# Patient Record
Sex: MALE | URBAN - METROPOLITAN AREA
[De-identification: names, ages, dates, MRNs, and addresses within clinical notes are randomized per-mention and may not be internally consistent; named-entity substitution may affect disease eponyms.]

---

## 2020-01-08 ENCOUNTER — EMERGENCY (EMERGENCY)
Facility: HOSPITAL | Age: 25
LOS: 0 days | Discharge: PSYCHIATRIC OR SUBSTANCE USE FACILITY | End: 2020-01-09
Attending: EMERGENCY MEDICINE
Payer: MEDICAID

## 2020-01-08 VITALS
RESPIRATION RATE: 19 BRPM | OXYGEN SATURATION: 99 % | WEIGHT: 160.06 LBS | HEART RATE: 102 BPM | DIASTOLIC BLOOD PRESSURE: 105 MMHG | SYSTOLIC BLOOD PRESSURE: 195 MMHG

## 2020-01-08 DIAGNOSIS — F25.9 SCHIZOAFFECTIVE DISORDER, UNSPECIFIED: ICD-10-CM

## 2020-01-08 DIAGNOSIS — F99 MENTAL DISORDER, NOT OTHERWISE SPECIFIED: ICD-10-CM

## 2020-01-08 DIAGNOSIS — R45.850 HOMICIDAL IDEATIONS: ICD-10-CM

## 2020-01-08 DIAGNOSIS — R46.2 STRANGE AND INEXPLICABLE BEHAVIOR: ICD-10-CM

## 2020-01-08 LAB
ALBUMIN SERPL ELPH-MCNC: 3.9 G/DL — SIGNIFICANT CHANGE UP (ref 3.3–5)
ALP SERPL-CCNC: 86 U/L — SIGNIFICANT CHANGE UP (ref 40–120)
ALT FLD-CCNC: 23 U/L — SIGNIFICANT CHANGE UP (ref 12–78)
AMPHET UR-MCNC: NEGATIVE — SIGNIFICANT CHANGE UP
ANION GAP SERPL CALC-SCNC: 7 MMOL/L — SIGNIFICANT CHANGE UP (ref 5–17)
APAP SERPL-MCNC: < 2 UG/ML (ref 10–30)
AST SERPL-CCNC: 24 U/L — SIGNIFICANT CHANGE UP (ref 15–37)
BARBITURATES UR SCN-MCNC: NEGATIVE — SIGNIFICANT CHANGE UP
BASOPHILS # BLD AUTO: 0.04 K/UL — SIGNIFICANT CHANGE UP (ref 0–0.2)
BASOPHILS NFR BLD AUTO: 0.5 % — SIGNIFICANT CHANGE UP (ref 0–2)
BENZODIAZ UR-MCNC: NEGATIVE — SIGNIFICANT CHANGE UP
BILIRUB SERPL-MCNC: 0.5 MG/DL — SIGNIFICANT CHANGE UP (ref 0.2–1.2)
BUN SERPL-MCNC: 7 MG/DL — SIGNIFICANT CHANGE UP (ref 7–23)
CALCIUM SERPL-MCNC: 9.3 MG/DL — SIGNIFICANT CHANGE UP (ref 8.5–10.1)
CHLORIDE SERPL-SCNC: 109 MMOL/L — HIGH (ref 96–108)
CO2 SERPL-SCNC: 27 MMOL/L — SIGNIFICANT CHANGE UP (ref 22–31)
COCAINE METAB.OTHER UR-MCNC: NEGATIVE — SIGNIFICANT CHANGE UP
CREAT SERPL-MCNC: 1.1 MG/DL — SIGNIFICANT CHANGE UP (ref 0.5–1.3)
EOSINOPHIL # BLD AUTO: 0.24 K/UL — SIGNIFICANT CHANGE UP (ref 0–0.5)
EOSINOPHIL NFR BLD AUTO: 2.8 % — SIGNIFICANT CHANGE UP (ref 0–6)
ETHANOL SERPL-MCNC: <10 MG/DL — SIGNIFICANT CHANGE UP (ref 0–10)
GLUCOSE SERPL-MCNC: 89 MG/DL — SIGNIFICANT CHANGE UP (ref 70–99)
HCT VFR BLD CALC: 47.5 % — SIGNIFICANT CHANGE UP (ref 39–50)
HGB BLD-MCNC: 15.5 G/DL — SIGNIFICANT CHANGE UP (ref 13–17)
IMM GRANULOCYTES NFR BLD AUTO: 0.4 % — SIGNIFICANT CHANGE UP (ref 0–1.5)
LYMPHOCYTES # BLD AUTO: 2.01 K/UL — SIGNIFICANT CHANGE UP (ref 1–3.3)
LYMPHOCYTES # BLD AUTO: 23.8 % — SIGNIFICANT CHANGE UP (ref 13–44)
MCHC RBC-ENTMCNC: 29 PG — SIGNIFICANT CHANGE UP (ref 27–34)
MCHC RBC-ENTMCNC: 32.6 GM/DL — SIGNIFICANT CHANGE UP (ref 32–36)
MCV RBC AUTO: 88.8 FL — SIGNIFICANT CHANGE UP (ref 80–100)
METHADONE UR-MCNC: NEGATIVE — SIGNIFICANT CHANGE UP
MONOCYTES # BLD AUTO: 0.8 K/UL — SIGNIFICANT CHANGE UP (ref 0–0.9)
MONOCYTES NFR BLD AUTO: 9.5 % — SIGNIFICANT CHANGE UP (ref 2–14)
NEUTROPHILS # BLD AUTO: 5.31 K/UL — SIGNIFICANT CHANGE UP (ref 1.8–7.4)
NEUTROPHILS NFR BLD AUTO: 63 % — SIGNIFICANT CHANGE UP (ref 43–77)
NRBC # BLD: 0 /100 WBCS — SIGNIFICANT CHANGE UP (ref 0–0)
OPIATES UR-MCNC: NEGATIVE — SIGNIFICANT CHANGE UP
PCP SPEC-MCNC: SIGNIFICANT CHANGE UP
PCP UR-MCNC: NEGATIVE — SIGNIFICANT CHANGE UP
PLATELET # BLD AUTO: 214 K/UL — SIGNIFICANT CHANGE UP (ref 150–400)
POTASSIUM SERPL-MCNC: 3.7 MMOL/L — SIGNIFICANT CHANGE UP (ref 3.5–5.3)
POTASSIUM SERPL-SCNC: 3.7 MMOL/L — SIGNIFICANT CHANGE UP (ref 3.5–5.3)
PROT SERPL-MCNC: 7.9 GM/DL — SIGNIFICANT CHANGE UP (ref 6–8.3)
RBC # BLD: 5.35 M/UL — SIGNIFICANT CHANGE UP (ref 4.2–5.8)
RBC # FLD: 12.7 % — SIGNIFICANT CHANGE UP (ref 10.3–14.5)
SALICYLATES SERPL-MCNC: <1.7 MG/DL — LOW (ref 2.8–20)
SODIUM SERPL-SCNC: 143 MMOL/L — SIGNIFICANT CHANGE UP (ref 135–145)
THC UR QL: POSITIVE — SIGNIFICANT CHANGE UP
WBC # BLD: 8.43 K/UL — SIGNIFICANT CHANGE UP (ref 3.8–10.5)
WBC # FLD AUTO: 8.43 K/UL — SIGNIFICANT CHANGE UP (ref 3.8–10.5)

## 2020-01-08 PROCEDURE — 99285 EMERGENCY DEPT VISIT HI MDM: CPT

## 2020-01-08 PROCEDURE — 90792 PSYCH DIAG EVAL W/MED SRVCS: CPT | Mod: GT

## 2020-01-08 PROCEDURE — 93010 ELECTROCARDIOGRAM REPORT: CPT

## 2020-01-08 RX ORDER — HALOPERIDOL DECANOATE 100 MG/ML
5 INJECTION INTRAMUSCULAR ONCE
Refills: 0 | Status: COMPLETED | OUTPATIENT
Start: 2020-01-08 | End: 2020-01-08

## 2020-01-08 RX ADMIN — Medication 2 MILLIGRAM(S): at 19:36

## 2020-01-08 RX ADMIN — HALOPERIDOL DECANOATE 5 MILLIGRAM(S): 100 INJECTION INTRAMUSCULAR at 19:35

## 2020-01-08 NOTE — ED BEHAVIORAL HEALTH ASSESSMENT NOTE - DESCRIPTION
Pt was in ED ~2 hours prior to telepsychiatry consult which was requested at 17:35 and started at 18:28. Patient was hostile, superficially cooperative in the ED. He was kept on CO due to aggression. Pt. denies psychiatric symptoms and SI/HI AH/VH. He was very irritable, however was able to give blood and urine. He became agitated and was given a STAT dose of Haldol 5mg IM and Ativan 2mg IM. None Unknown, patient refuses to discuss

## 2020-01-08 NOTE — ED BEHAVIORAL HEALTH ASSESSMENT NOTE - ADDITIONAL DETAILS ALL
Patient was highly irritable and agitated, refused to answer questions about suicidality with writer.

## 2020-01-08 NOTE — ED BEHAVIORAL HEALTH ASSESSMENT NOTE - DETAILS
Patient was agitated and refused to answer Patient was aggressive and making homicidal statements to police today Will discuss case with [facility] Discussed case and plan with ED attending

## 2020-01-08 NOTE — ED BEHAVIORAL HEALTH ASSESSMENT NOTE - DESCRIPTION (FIRST USE, LAST USE, QUANTITY, FREQUENCY, DURATION)
patient refuses to discuss substance use history but PSYCKES indicated alcohol use disorder Urine tox was +, patient refuses to discuss substance use history

## 2020-01-08 NOTE — ED BEHAVIORAL HEALTH ASSESSMENT NOTE - SUMMARY
Patient is a 24 year old AA male with a past psychiatric history significant for schizoaffective disorder, multiple inpatient admissions (last at [facility] 7/18-7/24/18), no current outpatient treatment, and history of aggression, unclear history of suicidality and cannabis use disorder who was BIB police due to aggressive behavior. On exam, patient presents in a decompensated state due to treatment nonadherence. He presents agitated, irritable, guarded, and unpredictable with poor insight and judgement. He remains at an elevated risk to harm to self/others, therefore would benefit from inpatient admission for stabilization and safety.    Plan:  -Involuntary psychiatric admission  -Continue 1:1 observation  -PRN’s, will defer standing medications to inpatient team   -Collateral needed

## 2020-01-08 NOTE — ED BEHAVIORAL HEALTH ASSESSMENT NOTE - HPI (INCLUDE ILLNESS QUALITY, SEVERITY, DURATION, TIMING, CONTEXT, MODIFYING FACTORS, ASSOCIATED SIGNS AND SYMPTOMS)
Patient is a 24 year old AA male with a past psychiatric history significant for schizoaffective disorder, multiple inpatient admissions (last at [facility] 7/18-7/24/18), no current outpatient treatment, and history of aggression, unclear history of suicidality and cannabis use disorder. Patient was BIB police due to aggressive behavior.     As per chart, police were called to his apartment after he was threating to “kill people” and was “combative.” He barricaded himself in his apartment and the SWAT team had to be called to remove patient and bring him to the ER. On exam, patient was uncooperative and irritable. He initially refused to answer questions and would nod his head yes/no. Later, he reports he doesn’t have a psychiatric history and “don’t need no meds.” He denies any symptoms of depression, anxiety, hardik or psychosis. Denies any past psychiatric medication, outpatient or inpatient treatment. When writer informed him that charts indicate prior psych treatment he just said “no.” He was escalating during interview, shouting at writer “are you high?” and later said he isn’t answering anymore questions and stayed mute. After the interview, patient was medicated with Haldol 5mg IM and Ativan 2mg IM.     Unable to get any more information from patient. Patient refused to provide any collaterals at this time.

## 2020-01-08 NOTE — ED ADULT NURSE NOTE - CHIEF COMPLAINT QUOTE
patient BIBA as per EMS and Police patient has combative , " wanted to Kill people " as per Police history of psych

## 2020-01-08 NOTE — ED ADULT TRIAGE NOTE - CHIEF COMPLAINT QUOTE
patient BIBA as per EMS and Police patient has combative , history of psych patient BIBA as per EMS and Police patient has combative , " wanted to Kill people " as per Police history of psych

## 2020-01-08 NOTE — ED PROVIDER NOTE - OBJECTIVE STATEMENT
Pt is a 25 yo gentleman with a pmhx of psychiatric condition per police who presents to the ED with police and EMS for agitation. Reportedly was threatening to hurt people, and barricaded himself in his apartment, and SWAT team called to bring patient out. Pt has been compliant. Is guarded, but compliant. When asked, admits to prior psychiatric admissions, but declines to say for what, and if he has been taking medications.

## 2020-01-08 NOTE — ED BEHAVIORAL HEALTH ASSESSMENT NOTE - RISK ASSESSMENT
Patient has chronically elevated risk of harm to self and others given current episode of psychosis, aggressive behavior, substance use history, no current treatment with poor insight/judgement. Moderate Acute Suicide Risk

## 2020-01-08 NOTE — ED BEHAVIORAL HEALTH ASSESSMENT NOTE - VIOLENCE RISK FACTORS:
Antisocial behavior/cognition (past or present)/Substance abuse/Impulsivity/Violent ideation/threat/speech/Affective dysregulation/Lack of insight into violence risk/need for treatment/Noncompliance with treatment

## 2020-01-08 NOTE — ED ADULT NURSE NOTE - OBJECTIVE STATEMENT
PT was brought by 6 police offices handcuffed. As Per PD pt,  has threat neighbors to kill them. they had to squat teem removed pt from [facility]. Pt is not answering to questions and appears to be on edge and tense. Pt belonging return to security and [placed on 1:1 for safety,, NO medical hx on pt

## 2020-01-08 NOTE — ED BEHAVIORAL HEALTH ASSESSMENT NOTE - OTHER PAST PSYCHIATRIC HISTORY (INCLUDE DETAILS REGARDING ONSET, COURSE OF ILLNESS, INPATIENT/OUTPATIENT TREATMENT)
As per PSYKES, patient has a history of schizoaffective disorder, cannabis use disorder and alcohol use disorder. He has multiple prior admissions, last one at [facility] 7/18-7/24/18.   No current outpatient treatment.

## 2020-01-09 ENCOUNTER — INPATIENT (INPATIENT)
Facility: HOSPITAL | Age: 25
LOS: 7 days | Discharge: HOME | End: 2020-01-17
Attending: PSYCHIATRY & NEUROLOGY | Admitting: PSYCHIATRY & NEUROLOGY
Payer: MEDICAID

## 2020-01-09 VITALS
DIASTOLIC BLOOD PRESSURE: 81 MMHG | OXYGEN SATURATION: 97 % | HEART RATE: 97 BPM | SYSTOLIC BLOOD PRESSURE: 138 MMHG | RESPIRATION RATE: 18 BRPM

## 2020-01-09 VITALS
DIASTOLIC BLOOD PRESSURE: 87 MMHG | WEIGHT: 220.46 LBS | HEART RATE: 84 BPM | SYSTOLIC BLOOD PRESSURE: 133 MMHG | OXYGEN SATURATION: 100 % | TEMPERATURE: 97 F | RESPIRATION RATE: 16 BRPM | HEIGHT: 75 IN

## 2020-01-09 DIAGNOSIS — F20.9 SCHIZOPHRENIA, UNSPECIFIED: ICD-10-CM

## 2020-01-09 RX ORDER — HALOPERIDOL DECANOATE 100 MG/ML
5 INJECTION INTRAMUSCULAR EVERY 6 HOURS
Refills: 0 | Status: DISCONTINUED | OUTPATIENT
Start: 2020-01-09 | End: 2020-01-17

## 2020-01-09 RX ORDER — HALOPERIDOL DECANOATE 100 MG/ML
5 INJECTION INTRAMUSCULAR
Refills: 0 | Status: DISCONTINUED | OUTPATIENT
Start: 2020-01-09 | End: 2020-01-17

## 2020-01-09 RX ORDER — HALOPERIDOL DECANOATE 100 MG/ML
5 INJECTION INTRAMUSCULAR ONCE
Refills: 0 | Status: DISCONTINUED | OUTPATIENT
Start: 2020-01-09 | End: 2020-01-17

## 2020-01-09 RX ORDER — DIPHENHYDRAMINE HCL 50 MG
50 CAPSULE ORAL ONCE
Refills: 0 | Status: DISCONTINUED | OUTPATIENT
Start: 2020-01-09 | End: 2020-01-17

## 2020-01-09 RX ORDER — BENZTROPINE MESYLATE 1 MG
1 TABLET ORAL
Refills: 0 | Status: DISCONTINUED | OUTPATIENT
Start: 2020-01-09 | End: 2020-01-17

## 2020-01-09 RX ORDER — DIPHENHYDRAMINE HCL 50 MG
50 CAPSULE ORAL EVERY 6 HOURS
Refills: 0 | Status: DISCONTINUED | OUTPATIENT
Start: 2020-01-09 | End: 2020-01-17

## 2020-01-10 PROCEDURE — 99232 SBSQ HOSP IP/OBS MODERATE 35: CPT

## 2020-01-11 PROCEDURE — 99232 SBSQ HOSP IP/OBS MODERATE 35: CPT

## 2020-01-12 PROCEDURE — 99232 SBSQ HOSP IP/OBS MODERATE 35: CPT

## 2020-01-12 RX ORDER — HALOPERIDOL DECANOATE 100 MG/ML
5 INJECTION INTRAMUSCULAR ONCE
Refills: 0 | Status: COMPLETED | OUTPATIENT
Start: 2020-01-12 | End: 2020-01-12

## 2020-01-12 RX ORDER — DIPHENHYDRAMINE HCL 50 MG
50 CAPSULE ORAL ONCE
Refills: 0 | Status: COMPLETED | OUTPATIENT
Start: 2020-01-12 | End: 2020-01-12

## 2020-01-12 RX ADMIN — Medication 50 MILLIGRAM(S): at 21:45

## 2020-01-12 RX ADMIN — HALOPERIDOL DECANOATE 5 MILLIGRAM(S): 100 INJECTION INTRAMUSCULAR at 21:45

## 2020-01-12 RX ADMIN — Medication 2 MILLIGRAM(S): at 21:45

## 2020-01-13 PROCEDURE — 99231 SBSQ HOSP IP/OBS SF/LOW 25: CPT

## 2020-01-14 PROCEDURE — 99231 SBSQ HOSP IP/OBS SF/LOW 25: CPT

## 2020-01-14 RX ORDER — DIPHENHYDRAMINE HCL 50 MG
50 CAPSULE ORAL ONCE
Refills: 0 | Status: COMPLETED | OUTPATIENT
Start: 2020-01-14 | End: 2020-01-14

## 2020-01-14 RX ORDER — HALOPERIDOL DECANOATE 100 MG/ML
5 INJECTION INTRAMUSCULAR ONCE
Refills: 0 | Status: COMPLETED | OUTPATIENT
Start: 2020-01-14 | End: 2020-01-14

## 2020-01-14 RX ORDER — HALOPERIDOL DECANOATE 100 MG/ML
50 INJECTION INTRAMUSCULAR ONCE
Refills: 0 | Status: COMPLETED | OUTPATIENT
Start: 2020-01-14 | End: 2020-01-14

## 2020-01-14 RX ADMIN — Medication 2 MILLIGRAM(S): at 23:00

## 2020-01-14 RX ADMIN — HALOPERIDOL DECANOATE 5 MILLIGRAM(S): 100 INJECTION INTRAMUSCULAR at 23:00

## 2020-01-14 RX ADMIN — HALOPERIDOL DECANOATE 50 MILLIGRAM(S): 100 INJECTION INTRAMUSCULAR at 16:19

## 2020-01-14 RX ADMIN — Medication 50 MILLIGRAM(S): at 23:00

## 2020-01-16 LAB
CHOLEST SERPL-MCNC: 184 MG/DL — SIGNIFICANT CHANGE UP (ref 120–199)
HBA1C BLD-MCNC: 5.6 % — SIGNIFICANT CHANGE UP (ref 4–5.6)
HDLC SERPL-MCNC: 57 MG/DL — HIGH (ref 35–55)
LIPID PNL WITH DIRECT LDL SERPL: 110 MG/DL — SIGNIFICANT CHANGE UP
TRIGL SERPL-MCNC: 131 MG/DL — SIGNIFICANT CHANGE UP (ref 10–149)

## 2020-01-17 VITALS — TEMPERATURE: 98 F | HEART RATE: 68 BPM | SYSTOLIC BLOOD PRESSURE: 120 MMHG | DIASTOLIC BLOOD PRESSURE: 90 MMHG

## 2020-01-17 PROCEDURE — 99238 HOSP IP/OBS DSCHRG MGMT 30/<: CPT

## 2020-01-17 RX ORDER — HALOPERIDOL DECANOATE 100 MG/ML
100 INJECTION INTRAMUSCULAR
Qty: 1 | Refills: 0
Start: 2020-01-17

## 2023-02-21 NOTE — ED ADULT NURSE NOTE - NS ED NOTE ABUSE RESPONSE YN
pt with fever x2 days, tmax 101, no meds given today. now with cough and congestion.  denies N/V/D.  tolerating PO, pt awake and alert, cap refill less than 2 seconds.  lung sounds clear.  no pmhx no known allergies.
Unable to assess due to medical condition